# Patient Record
Sex: MALE | NOT HISPANIC OR LATINO | Employment: FULL TIME | URBAN - METROPOLITAN AREA
[De-identification: names, ages, dates, MRNs, and addresses within clinical notes are randomized per-mention and may not be internally consistent; named-entity substitution may affect disease eponyms.]

---

## 2023-09-11 PROBLEM — G47.9 SLEEP DISTURBANCE: Status: ACTIVE | Noted: 2023-09-11

## 2023-09-11 PROBLEM — G47.30 SLEEP APNEA: Status: ACTIVE | Noted: 2023-09-11

## 2023-09-11 PROBLEM — G47.10 HYPERSOMNIA: Status: ACTIVE | Noted: 2023-09-11

## 2023-09-11 PROBLEM — I10 BENIGN ESSENTIAL HYPERTENSION: Status: ACTIVE | Noted: 2023-09-11

## 2023-09-11 PROBLEM — K22.70 BARRETT'S ESOPHAGUS: Status: ACTIVE | Noted: 2023-09-11

## 2023-09-11 RX ORDER — PANTOPRAZOLE SODIUM 40 MG/1
TABLET, DELAYED RELEASE ORAL
COMMUNITY

## 2023-09-11 RX ORDER — LISINOPRIL 5 MG/1
1 TABLET ORAL DAILY
COMMUNITY

## 2023-11-30 ENCOUNTER — TELEMEDICINE (OUTPATIENT)
Dept: PRIMARY CARE | Facility: CLINIC | Age: 53
End: 2023-11-30
Payer: COMMERCIAL

## 2023-11-30 ENCOUNTER — TELEPHONE (OUTPATIENT)
Dept: PRIMARY CARE | Facility: CLINIC | Age: 53
End: 2023-11-30
Payer: COMMERCIAL

## 2023-11-30 DIAGNOSIS — U07.1 COVID-19: Primary | ICD-10-CM

## 2023-11-30 PROCEDURE — 99213 OFFICE O/P EST LOW 20 MIN: CPT | Performed by: FAMILY MEDICINE

## 2023-11-30 RX ORDER — NIRMATRELVIR AND RITONAVIR 300-100 MG
3 KIT ORAL 2 TIMES DAILY
Qty: 30 TABLET | Refills: 0 | Status: SHIPPED | OUTPATIENT
Start: 2023-11-30 | End: 2023-12-05

## 2023-11-30 ASSESSMENT — ENCOUNTER SYMPTOMS
DEPRESSION: 0
FEVER: 1
CHILLS: 1
LOSS OF SENSATION IN FEET: 0
OCCASIONAL FEELINGS OF UNSTEADINESS: 0
COUGH: 1
SHORTNESS OF BREATH: 0
MYALGIAS: 1
HEADACHES: 1

## 2023-11-30 ASSESSMENT — PATIENT HEALTH QUESTIONNAIRE - PHQ9
SUM OF ALL RESPONSES TO PHQ9 QUESTIONS 1 & 2: 0
2. FEELING DOWN, DEPRESSED OR HOPELESS: NOT AT ALL
1. LITTLE INTEREST OR PLEASURE IN DOING THINGS: NOT AT ALL

## 2023-11-30 ASSESSMENT — PAIN SCALES - GENERAL: PAINLEVEL: 0-NO PAIN

## 2023-11-30 NOTE — PATIENT INSTRUCTIONS
COVID Positive:  Education on COVID -19 that included but was not limited to:  expected course of illness,   supportive care measures and the signs and symptoms that are alarming and when to call 911 or seek medical attention.   Also - talked about the need to isolate for at least 5  days from the onset of symptoms, and then,  if feeling obviously better and  no fever without meds for at least 24 hours - can be out of house with a mask on for the remainder of the 10 days.   Discussed contagiousness disease.     Discussed  moving a lot, a lot of fluids,Tylenol or Ibuprofen prn for fevers/muscle aches,  Mucinex/Coricidin Congestion and cough prn,  saline spray and steam often.     If patient is a candidate, discussed RX for Paxlovid,  risks and benefits, and about how it is not FDA approved, only under [facility].   Discussed side effects, including metal taste, and the possibility of rebound symptoms.

## 2023-11-30 NOTE — TELEPHONE ENCOUNTER
Ryan just tested + for COVID.  Would like to have a virtual visit.  Please call wife back. Thank you

## 2023-11-30 NOTE — PROGRESS NOTES
Subjective   Patient ID: Ryan Thibodeaux is a 53 y.o. male who presents for COVID + 11/30/2023 ([phone]).    Cough  This is a new problem. The current episode started yesterday. The cough is Non-productive. Associated symptoms include chills, a fever, headaches and myalgias. Pertinent negatives include no shortness of breath. Associated symptoms comments: Positive for COVID. Treatments tried: Tylenol.        Review of Systems   Constitutional:  Positive for chills and fever.   Respiratory:  Positive for cough. Negative for shortness of breath.    Musculoskeletal:  Positive for myalgias.   Neurological:  Positive for headaches.       Objective   There were no vitals taken for this visit.    Physical Exam  Vitals reviewed: phone call visit..         Assessment/Plan   Diagnoses and all orders for this visit:  COVID-19    Patient Instructions   COVID Positive:  Education on COVID -19 that included but was not limited to:  expected course of illness,   supportive care measures and the signs and symptoms that are alarming and when to call 911 or seek medical attention.   Also - talked about the need to isolate for at least 5  days from the onset of symptoms, and then,  if feeling obviously better and  no fever without meds for at least 24 hours - can be out of house with a mask on for the remainder of the 10 days.   Discussed contagiousness disease.     Discussed  moving a lot, a lot of fluids,Tylenol or Ibuprofen prn for fevers/muscle aches,  Mucinex/Coricidin Congestion and cough prn,  saline spray and steam often.     If patient is a candidate, discussed RX for Paxlovid,  risks and benefits, and about how it is not FDA approved, only under [facility].   Discussed side effects, including metal taste, and the possibility of rebound symptoms.

## 2023-12-04 ENCOUNTER — TELEPHONE (OUTPATIENT)
Dept: PRIMARY CARE | Facility: CLINIC | Age: 53
End: 2023-12-04
Payer: COMMERCIAL

## 2023-12-04 NOTE — TELEPHONE ENCOUNTER
Patient would like to RTW on Wednesday. Went back over staying isolated for the 5 days and then wearing a mask for additional 5 days out in public if symptom free.

## 2023-12-04 NOTE — TELEPHONE ENCOUNTER
Ryan called to see if he is able to get a return to work note. He mentioned that he is doing much better. He also wanted to know if and how long you would be considered contagious.

## 2023-12-11 NOTE — TELEPHONE ENCOUNTER
advised patient that he can test positive for quite some time after initial COVID test. Any other recommendations? HE is feeling a little improvement in his symptoms and was advised to mask for another 5 days if still symptomatic.

## 2023-12-11 NOTE — TELEPHONE ENCOUNTER
Pt is calling he tested positive again last night 12/10. Originally tested covid positive on 12/3 [place], then tested  negative Friday 12/8/23, Then tested COVID positive again on [place] 12/10/23 SX: runny nose pnd, cough, OTC: Benadryl (does help a little) [phone]

## 2024-05-30 PROBLEM — G47.10 HYPERSOMNIA: Status: RESOLVED | Noted: 2023-09-11 | Resolved: 2024-05-30

## 2024-06-03 ENCOUNTER — OFFICE VISIT (OUTPATIENT)
Dept: PRIMARY CARE | Facility: CLINIC | Age: 54
End: 2024-06-03
Payer: COMMERCIAL

## 2024-06-03 VITALS
HEIGHT: 71 IN | SYSTOLIC BLOOD PRESSURE: 122 MMHG | BODY MASS INDEX: 25.51 KG/M2 | DIASTOLIC BLOOD PRESSURE: 80 MMHG | WEIGHT: 182.2 LBS

## 2024-06-03 DIAGNOSIS — I10 BENIGN ESSENTIAL HYPERTENSION: ICD-10-CM

## 2024-06-03 DIAGNOSIS — Z00.00 ROUTINE GENERAL MEDICAL EXAMINATION AT A HEALTH CARE FACILITY: Primary | ICD-10-CM

## 2024-06-03 DIAGNOSIS — Z12.5 SCREENING FOR PROSTATE CANCER: ICD-10-CM

## 2024-06-03 DIAGNOSIS — K22.719 BARRETT'S ESOPHAGUS WITH DYSPLASIA: ICD-10-CM

## 2024-06-03 PROCEDURE — 99396 PREV VISIT EST AGE 40-64: CPT | Performed by: FAMILY MEDICINE

## 2024-06-03 PROCEDURE — 3074F SYST BP LT 130 MM HG: CPT | Performed by: FAMILY MEDICINE

## 2024-06-03 PROCEDURE — 3079F DIAST BP 80-89 MM HG: CPT | Performed by: FAMILY MEDICINE

## 2024-06-03 PROCEDURE — 1036F TOBACCO NON-USER: CPT | Performed by: FAMILY MEDICINE

## 2024-06-03 NOTE — PATIENT INSTRUCTIONS
Patient is here for physical.  - Order for blood work.  Up to date on colon cancer screening.      I recommend that you get the shingrix vaccine for shingles prevention. Shingles vaccination requires a 2 shots series divided by 2 to 6 months. Please get at the pharmacy.     For blood pressure - well controlled.     For barretts -well controlled.  Scheduled for EGD.     Follow up in 1 year or sooner if issues.

## 2024-06-03 NOTE — PROGRESS NOTES
"Subjective   Patient ID: Ryan Thibodeaux is a 53 y.o. male who presents for Annual Exam.    Here for physical.  Pt has isela.      GERD:  -F/U: Using protonix - doing well.  Tolerating well  -Dysphagia: none  -Hx of EGD: scheduled for EGD  -Changes in Bowels: none  -Blood in stool:  none    Hypertension  -Patient is here for follow-up of elevated blood pressure.   -Blood pressure is well controlled.  -Cardiac symptoms: none.   -Patient denies chest pain, dyspnea, and irregular heart beat.   -Cardiologist:           Review of Systems    Objective   /80 (BP Location: Right arm, Patient Position: Sitting, BP Cuff Size: Adult)   Ht 1.791 m (5' 10.5\")   Wt 82.6 kg (182 lb 3.2 oz)   BMI 25.77 kg/m²     Physical Exam  Vitals reviewed.   Constitutional:       General: He is not in acute distress.  Cardiovascular:      Rate and Rhythm: Normal rate and regular rhythm.   Pulmonary:      Effort: Pulmonary effort is normal.      Breath sounds: No wheezing or rhonchi.   Musculoskeletal:      Right lower leg: No edema.      Left lower leg: No edema.   Lymphadenopathy:      Cervical: No cervical adenopathy.   Neurological:      Mental Status: He is alert.         Assessment/Plan   Diagnoses and all orders for this visit:  Routine general medical examination at a health care facility  Benign essential hypertension  Winters's esophagus with dysplasia         Patient Instructions   Patient is here for physical.  - Order for blood work.  Up to date on colon cancer screening.      I recommend that you get the shingrix vaccine for shingles prevention. Shingles vaccination requires a 2 shots series divided by 2 to 6 months. Please get at the pharmacy.     For blood pressure - well controlled.     For barretts -well controlled.  Scheduled for EGD.     Follow up in 1 year or sooner if issues.       "

## 2024-08-22 DIAGNOSIS — Z00.00 ENCOUNTER FOR GENERAL ADULT MEDICAL EXAMINATION WITHOUT ABNORMAL FINDINGS: ICD-10-CM

## 2024-08-22 RX ORDER — LISINOPRIL 5 MG/1
5 TABLET ORAL DAILY
Qty: 90 TABLET | Refills: 3 | Status: SHIPPED | OUTPATIENT
Start: 2024-08-22